# Patient Record
Sex: FEMALE | Race: WHITE
[De-identification: names, ages, dates, MRNs, and addresses within clinical notes are randomized per-mention and may not be internally consistent; named-entity substitution may affect disease eponyms.]

---

## 2021-07-01 ENCOUNTER — HOSPITAL ENCOUNTER (EMERGENCY)
Dept: HOSPITAL 35 - ER | Age: 66
Discharge: HOME | End: 2021-07-01
Payer: COMMERCIAL

## 2021-07-01 VITALS — HEIGHT: 62 IN | BODY MASS INDEX: 40.95 KG/M2 | WEIGHT: 222.51 LBS

## 2021-07-01 VITALS — SYSTOLIC BLOOD PRESSURE: 165 MMHG | DIASTOLIC BLOOD PRESSURE: 84 MMHG

## 2021-07-01 DIAGNOSIS — I10: ICD-10-CM

## 2021-07-01 DIAGNOSIS — E66.9: ICD-10-CM

## 2021-07-01 DIAGNOSIS — Z88.6: ICD-10-CM

## 2021-07-01 DIAGNOSIS — J45.909: ICD-10-CM

## 2021-07-01 DIAGNOSIS — Z88.5: ICD-10-CM

## 2021-07-01 DIAGNOSIS — Z20.822: ICD-10-CM

## 2021-07-01 DIAGNOSIS — Z90.710: ICD-10-CM

## 2021-07-01 DIAGNOSIS — Z79.899: ICD-10-CM

## 2021-07-01 DIAGNOSIS — K21.9: ICD-10-CM

## 2021-07-01 DIAGNOSIS — R05: Primary | ICD-10-CM

## 2021-07-01 DIAGNOSIS — Z88.8: ICD-10-CM

## 2021-07-01 LAB
ANION GAP SERPL CALC-SCNC: 7 MMOL/L (ref 7–16)
BASOPHILS NFR BLD AUTO: 0.5 % (ref 0–2)
BUN SERPL-MCNC: 17 MG/DL (ref 7–18)
CALCIUM SERPL-MCNC: 8.6 MG/DL (ref 8.5–10.1)
CHLORIDE SERPL-SCNC: 107 MMOL/L (ref 98–107)
CO2 SERPL-SCNC: 28 MMOL/L (ref 21–32)
CREAT SERPL-MCNC: 1.3 MG/DL (ref 0.6–1)
EOSINOPHIL NFR BLD: 0 % (ref 0–3)
ERYTHROCYTE [DISTWIDTH] IN BLOOD BY AUTOMATED COUNT: 13.3 % (ref 10.5–14.5)
GLUCOSE SERPL-MCNC: 223 MG/DL (ref 74–106)
GRANULOCYTES NFR BLD MANUAL: 90.3 % (ref 36–66)
HCT VFR BLD CALC: 40.2 % (ref 37–47)
HGB BLD-MCNC: 13 GM/DL (ref 12–15)
LYMPHOCYTES NFR BLD AUTO: 8.3 % (ref 24–44)
MCH RBC QN AUTO: 27.1 PG (ref 26–34)
MCHC RBC AUTO-ENTMCNC: 32.3 G/DL (ref 28–37)
MCV RBC: 83.7 FL (ref 80–100)
MONOCYTES NFR BLD: 0.9 % (ref 1–8)
NEUTROPHILS # BLD: 7.2 THOU/UL (ref 1.4–8.2)
PLATELET # BLD: 106 THOU/UL (ref 150–400)
POTASSIUM SERPL-SCNC: 4.6 MMOL/L (ref 3.5–5.1)
RBC # BLD AUTO: 4.8 MIL/UL (ref 4.2–5)
SODIUM SERPL-SCNC: 142 MMOL/L (ref 136–145)
WBC # BLD AUTO: 8 THOU/UL (ref 4–11)

## 2021-07-02 NOTE — EKG
[place] [address] [facility]
[place]  [address]
Phone:  [phone]                    ELECTROCARDIOGRAM REPORT      
_______________________________________________________________________________
 
Name:       FRANCOROBERT          Room #:                     [facility]      [id number]     Account #:      [id number]  
Admission:  21    Attend Phys:                          
Discharge:  21    Date of Birth:  [birth year]  
                                                          Report #: [id number]
   [id number]
_______________________________________________________________________________
                         [facility] ED
                                       
Test Date:    2021               Test Time:    18:57:11
Pat Name:     ROBERT GAMEZ            Department:   
Patient ID:   [id number]            Room:          
Gender:       F                        Technician:   KF
:          [id number]               Requested By: Bala Porras
Order Number: [id number] MD:   Mejia Smith
                                 Measurements
Intervals                              Axis          
Rate:         82                       P:            45
FL:           149                      QRS:          9
QRSD:         87                       T:            33
QT:           371                                    
QTc:          434                                    
                           Interpretive Statements
Sinus rhythm
Compared to ECG 2003 20:12:39
Sinus tachycardia no longer present
Accelerated junctional rhythm no longer present
Myocardial infarct finding no longer present
Electronically Signed On 2021 7:22:28 CDT by Mejia Smith
[email or web address]
 
 
 
 
 
 
 
 
 
 
 
 
 
 
 
 
 
 
 
 
  <ELECTRONICALLY SIGNED>
   By: Mejia Smith MD, [facility]    
  21     0722
D: 21 1857                           _____________________________________
T: 21                           Mejia Smith MD, FACC      /EPI